# Patient Record
Sex: MALE | Race: BLACK OR AFRICAN AMERICAN | NOT HISPANIC OR LATINO
[De-identification: names, ages, dates, MRNs, and addresses within clinical notes are randomized per-mention and may not be internally consistent; named-entity substitution may affect disease eponyms.]

---

## 2020-03-25 ENCOUNTER — TRANSCRIPTION ENCOUNTER (OUTPATIENT)
Age: 40
End: 2020-03-25

## 2022-08-09 ENCOUNTER — EMERGENCY (EMERGENCY)
Facility: HOSPITAL | Age: 42
LOS: 1 days | Discharge: HOME | End: 2022-08-09
Attending: STUDENT IN AN ORGANIZED HEALTH CARE EDUCATION/TRAINING PROGRAM | Admitting: STUDENT IN AN ORGANIZED HEALTH CARE EDUCATION/TRAINING PROGRAM
Payer: COMMERCIAL

## 2022-08-09 VITALS
OXYGEN SATURATION: 97 % | HEART RATE: 61 BPM | RESPIRATION RATE: 20 BRPM | DIASTOLIC BLOOD PRESSURE: 88 MMHG | SYSTOLIC BLOOD PRESSURE: 133 MMHG | TEMPERATURE: 100 F

## 2022-08-09 VITALS
HEART RATE: 96 BPM | OXYGEN SATURATION: 97 % | DIASTOLIC BLOOD PRESSURE: 85 MMHG | WEIGHT: 259.93 LBS | SYSTOLIC BLOOD PRESSURE: 113 MMHG | HEIGHT: 76 IN | RESPIRATION RATE: 20 BRPM | TEMPERATURE: 100 F

## 2022-08-09 DIAGNOSIS — Z88.5 ALLERGY STATUS TO NARCOTIC AGENT: ICD-10-CM

## 2022-08-09 DIAGNOSIS — R06.02 SHORTNESS OF BREATH: ICD-10-CM

## 2022-08-09 DIAGNOSIS — Z20.822 CONTACT WITH AND (SUSPECTED) EXPOSURE TO COVID-19: ICD-10-CM

## 2022-08-09 DIAGNOSIS — I49.49 OTHER PREMATURE DEPOLARIZATION: ICD-10-CM

## 2022-08-09 DIAGNOSIS — R40.4 TRANSIENT ALTERATION OF AWARENESS: ICD-10-CM

## 2022-08-09 DIAGNOSIS — F17.200 NICOTINE DEPENDENCE, UNSPECIFIED, UNCOMPLICATED: ICD-10-CM

## 2022-08-09 DIAGNOSIS — R51.9 HEADACHE, UNSPECIFIED: ICD-10-CM

## 2022-08-09 DIAGNOSIS — R42 DIZZINESS AND GIDDINESS: ICD-10-CM

## 2022-08-09 DIAGNOSIS — J45.909 UNSPECIFIED ASTHMA, UNCOMPLICATED: ICD-10-CM

## 2022-08-09 DIAGNOSIS — Z87.19 PERSONAL HISTORY OF OTHER DISEASES OF THE DIGESTIVE SYSTEM: ICD-10-CM

## 2022-08-09 DIAGNOSIS — I10 ESSENTIAL (PRIMARY) HYPERTENSION: ICD-10-CM

## 2022-08-09 DIAGNOSIS — H53.8 OTHER VISUAL DISTURBANCES: ICD-10-CM

## 2022-08-09 LAB
ALBUMIN SERPL ELPH-MCNC: 4.2 G/DL — SIGNIFICANT CHANGE UP (ref 3.3–5)
ALBUMIN SERPL ELPH-MCNC: 4.6 G/DL — SIGNIFICANT CHANGE UP (ref 3.3–5)
ALP SERPL-CCNC: 75 U/L — SIGNIFICANT CHANGE UP (ref 40–120)
ALP SERPL-CCNC: 80 U/L — SIGNIFICANT CHANGE UP (ref 40–120)
ALT FLD-CCNC: 10 U/L — SIGNIFICANT CHANGE UP (ref 10–45)
ALT FLD-CCNC: 10 U/L — SIGNIFICANT CHANGE UP (ref 10–45)
ANION GAP SERPL CALC-SCNC: 12 MMOL/L — SIGNIFICANT CHANGE UP (ref 5–17)
ANION GAP SERPL CALC-SCNC: 15 MMOL/L — SIGNIFICANT CHANGE UP (ref 5–17)
APPEARANCE UR: CLEAR — SIGNIFICANT CHANGE UP
APTT BLD: 23 SEC — LOW (ref 27.5–35.5)
AST SERPL-CCNC: 19 U/L — SIGNIFICANT CHANGE UP (ref 10–40)
AST SERPL-CCNC: 20 U/L — SIGNIFICANT CHANGE UP (ref 10–40)
BACTERIA # UR AUTO: PRESENT /HPF
BILIRUB SERPL-MCNC: 0.6 MG/DL — SIGNIFICANT CHANGE UP (ref 0.2–1.2)
BILIRUB SERPL-MCNC: 0.7 MG/DL — SIGNIFICANT CHANGE UP (ref 0.2–1.2)
BILIRUB UR-MCNC: ABNORMAL
BUN SERPL-MCNC: 13 MG/DL — SIGNIFICANT CHANGE UP (ref 7–23)
BUN SERPL-MCNC: 16 MG/DL — SIGNIFICANT CHANGE UP (ref 7–23)
CALCIUM SERPL-MCNC: 10.5 MG/DL — SIGNIFICANT CHANGE UP (ref 8.4–10.5)
CALCIUM SERPL-MCNC: 9.8 MG/DL — SIGNIFICANT CHANGE UP (ref 8.4–10.5)
CHLORIDE SERPL-SCNC: 101 MMOL/L — SIGNIFICANT CHANGE UP (ref 96–108)
CHLORIDE SERPL-SCNC: 99 MMOL/L — SIGNIFICANT CHANGE UP (ref 96–108)
CO2 SERPL-SCNC: 24 MMOL/L — SIGNIFICANT CHANGE UP (ref 22–31)
CO2 SERPL-SCNC: 25 MMOL/L — SIGNIFICANT CHANGE UP (ref 22–31)
COLOR SPEC: YELLOW — SIGNIFICANT CHANGE UP
CREAT SERPL-MCNC: 1.1 MG/DL — SIGNIFICANT CHANGE UP (ref 0.5–1.3)
CREAT SERPL-MCNC: 1.77 MG/DL — HIGH (ref 0.5–1.3)
DIFF PNL FLD: NEGATIVE — SIGNIFICANT CHANGE UP
EGFR: 49 ML/MIN/1.73M2 — LOW
EGFR: 87 ML/MIN/1.73M2 — SIGNIFICANT CHANGE UP
EPI CELLS # UR: SIGNIFICANT CHANGE UP /HPF (ref 0–5)
GLUCOSE SERPL-MCNC: 105 MG/DL — HIGH (ref 70–99)
GLUCOSE SERPL-MCNC: 106 MG/DL — HIGH (ref 70–99)
GLUCOSE UR QL: NEGATIVE — SIGNIFICANT CHANGE UP
GRAN CASTS # UR COMP ASSIST: ABNORMAL /LPF
HCT VFR BLD CALC: 38.6 % — LOW (ref 39–50)
HGB BLD-MCNC: 13.9 G/DL — SIGNIFICANT CHANGE UP (ref 13–17)
HYALINE CASTS # UR AUTO: ABNORMAL /LPF (ref 0–2)
INR BLD: 1.04 — SIGNIFICANT CHANGE UP (ref 0.88–1.16)
KETONES UR-MCNC: 15 MG/DL
LACTATE SERPL-SCNC: 0.9 MMOL/L — SIGNIFICANT CHANGE UP (ref 0.5–2)
LACTATE SERPL-SCNC: 3.1 MMOL/L — HIGH (ref 0.5–2)
LEUKOCYTE ESTERASE UR-ACNC: ABNORMAL
MCHC RBC-ENTMCNC: 29.9 PG — SIGNIFICANT CHANGE UP (ref 27–34)
MCHC RBC-ENTMCNC: 36 GM/DL — SIGNIFICANT CHANGE UP (ref 32–36)
MCV RBC AUTO: 83 FL — SIGNIFICANT CHANGE UP (ref 80–100)
NITRITE UR-MCNC: NEGATIVE — SIGNIFICANT CHANGE UP
NRBC # BLD: 0 /100 WBCS — SIGNIFICANT CHANGE UP (ref 0–0)
PH UR: 7 — SIGNIFICANT CHANGE UP (ref 5–8)
PLATELET # BLD AUTO: 249 K/UL — SIGNIFICANT CHANGE UP (ref 150–400)
POTASSIUM SERPL-MCNC: 3.3 MMOL/L — LOW (ref 3.5–5.3)
POTASSIUM SERPL-MCNC: 4 MMOL/L — SIGNIFICANT CHANGE UP (ref 3.5–5.3)
POTASSIUM SERPL-SCNC: 3.3 MMOL/L — LOW (ref 3.5–5.3)
POTASSIUM SERPL-SCNC: 4 MMOL/L — SIGNIFICANT CHANGE UP (ref 3.5–5.3)
PROT SERPL-MCNC: 7.9 G/DL — SIGNIFICANT CHANGE UP (ref 6–8.3)
PROT SERPL-MCNC: 8.5 G/DL — HIGH (ref 6–8.3)
PROT UR-MCNC: 100 MG/DL
PROTHROM AB SERPL-ACNC: 12.4 SEC — SIGNIFICANT CHANGE UP (ref 10.5–13.4)
RBC # BLD: 4.65 M/UL — SIGNIFICANT CHANGE UP (ref 4.2–5.8)
RBC # FLD: 15 % — HIGH (ref 10.3–14.5)
RBC CASTS # UR COMP ASSIST: < 5 /HPF — SIGNIFICANT CHANGE UP
SARS-COV-2 RNA SPEC QL NAA+PROBE: NEGATIVE — SIGNIFICANT CHANGE UP
SODIUM SERPL-SCNC: 137 MMOL/L — SIGNIFICANT CHANGE UP (ref 135–145)
SODIUM SERPL-SCNC: 139 MMOL/L — SIGNIFICANT CHANGE UP (ref 135–145)
SP GR SPEC: 1.02 — SIGNIFICANT CHANGE UP (ref 1–1.03)
UROBILINOGEN FLD QL: 2 E.U./DL
WBC # BLD: 9.82 K/UL — SIGNIFICANT CHANGE UP (ref 3.8–10.5)
WBC # FLD AUTO: 9.82 K/UL — SIGNIFICANT CHANGE UP (ref 3.8–10.5)
WBC UR QL: ABNORMAL /HPF

## 2022-08-09 PROCEDURE — 85610 PROTHROMBIN TIME: CPT

## 2022-08-09 PROCEDURE — 83605 ASSAY OF LACTIC ACID: CPT

## 2022-08-09 PROCEDURE — 85730 THROMBOPLASTIN TIME PARTIAL: CPT

## 2022-08-09 PROCEDURE — G1004: CPT

## 2022-08-09 PROCEDURE — 82550 ASSAY OF CK (CPK): CPT

## 2022-08-09 PROCEDURE — 99285 EMERGENCY DEPT VISIT HI MDM: CPT | Mod: 25

## 2022-08-09 PROCEDURE — 70450 CT HEAD/BRAIN W/O DYE: CPT | Mod: ME

## 2022-08-09 PROCEDURE — 93005 ELECTROCARDIOGRAM TRACING: CPT

## 2022-08-09 PROCEDURE — 85027 COMPLETE CBC AUTOMATED: CPT

## 2022-08-09 PROCEDURE — 99285 EMERGENCY DEPT VISIT HI MDM: CPT

## 2022-08-09 PROCEDURE — 80053 COMPREHEN METABOLIC PANEL: CPT

## 2022-08-09 PROCEDURE — 87635 SARS-COV-2 COVID-19 AMP PRB: CPT

## 2022-08-09 PROCEDURE — 82962 GLUCOSE BLOOD TEST: CPT

## 2022-08-09 PROCEDURE — 93010 ELECTROCARDIOGRAM REPORT: CPT

## 2022-08-09 PROCEDURE — 36415 COLL VENOUS BLD VENIPUNCTURE: CPT

## 2022-08-09 PROCEDURE — 70450 CT HEAD/BRAIN W/O DYE: CPT | Mod: 26,ME

## 2022-08-09 PROCEDURE — 81001 URINALYSIS AUTO W/SCOPE: CPT

## 2022-08-09 RX ORDER — ACETAMINOPHEN 500 MG
650 TABLET ORAL ONCE
Refills: 0 | Status: COMPLETED | OUTPATIENT
Start: 2022-08-09 | End: 2022-08-09

## 2022-08-09 RX ORDER — POTASSIUM CHLORIDE 20 MEQ
40 PACKET (EA) ORAL ONCE
Refills: 0 | Status: COMPLETED | OUTPATIENT
Start: 2022-08-09 | End: 2022-08-09

## 2022-08-09 RX ORDER — SODIUM CHLORIDE 9 MG/ML
2000 INJECTION INTRAMUSCULAR; INTRAVENOUS; SUBCUTANEOUS ONCE
Refills: 0 | Status: COMPLETED | OUTPATIENT
Start: 2022-08-09 | End: 2022-08-09

## 2022-08-09 RX ADMIN — Medication 40 MILLIEQUIVALENT(S): at 18:39

## 2022-08-09 RX ADMIN — SODIUM CHLORIDE 2000 MILLILITER(S): 9 INJECTION INTRAMUSCULAR; INTRAVENOUS; SUBCUTANEOUS at 16:04

## 2022-08-09 RX ADMIN — Medication 650 MILLIGRAM(S): at 20:18

## 2022-08-09 RX ADMIN — Medication 650 MILLIGRAM(S): at 16:05

## 2022-08-09 NOTE — ED PROVIDER NOTE - CARE PROVIDER_API CALL
Shauna Herrera)  Neurology  [address], [address] [place] [address]  Phone: [phone]  Fax: [phone]  Follow Up Time: 4-6 Days

## 2022-08-09 NOTE — ED PROVIDER NOTE - NSFOLLOWUPINSTRUCTIONS_ED_ALL_ED_FT
Heat Stroke      Heat stroke is the most severe stage of heat illness. It usually happens when the body is not able to cool itself, such as during prolonged physical activity in hot weather.    There are two types of heat stroke:  •Exertional heat stroke (EHS). This happens when you are overly active in the heat, such as during intense exercise outdoors in hot and humid conditions. EHS is more common in young people who are healthy and active.      •Nonexertional heat stroke (NEHS). This happens when a person is exposed to heat for long periods of time, such as during a heat wave or not having access to air conditioning. Infants, older adults, and those with a long-term (chronic) disease are more likely to be affected by NEHS.      Heat stroke is a medical emergency. If you have heat stroke, your body needs to be cooled as soon as possible to prevent damage to your brain, heart, kidneys, and muscles. Heat stroke can be life-threatening.      What are the causes?    Heat stroke happens when the body cannot cool itself after being in the heat for a long time or being overly active in the heat.      What increases the risk?    The following factors may make you more likely to develop this condition:  •Being physically active in hot weather, such as working outdoors, doing  exercises outdoors, or playing sports.      •Being a very young child or an older adult.      •Being very overweight, in poor physical shape, or not being used to heat.      •Having a chronic condition that affects the heart and lungs, a neurological disorder, or a skin condition that interferes with sweating.      •Taking medicine that can interfere with the body's ability to cool down, such as beta blockers, diuretics, or antidepressants.      •Not drinking enough water or sports drinks with electrolytes in the heat.      •Drinking alcohol in the heat. This can interfere with heat regulation.      •Using certain drugs.      •Ignoring the warning signs of heat cramps or heat exhaustion, which can lead to heat stroke.        What are the signs or symptoms?    Signs of heat stroke include:  •Body temperature of 104°F (40°C) or higher.      •Extreme fatigue.      •Rapid heartbeat and breathing.      •Headaches.      •Severe nausea, vomiting, or both.      •Hot, red skin that is dry or moist.      •Changes in mental function. Signs may include dizziness, slurred speech, confusion, irritability, and delusions.    •Physical signs of brain dysfunction, such as:  •Clumsiness.      •Tremors.      •Uncontrolled muscle movements.      •Seizures.      •Loss of consciousness.          How is this diagnosed?    This condition may be diagnosed based on your symptoms and history of heat exposure. You may also have:  •A physical exam to check your body temperature and to look for signs of mental and physical brain dysfunction.      •Blood tests to measure electrolytes and to check for signs of damage to your kidneys or muscles.        How is this treated?    Heat stroke is a medical emergency. Call 911 if you or someone else has signs of heat stroke. Stay with the person until emergency services arrive and follow these instructions:  •Stop physical activity immediately.      •Move to a shaded, cooler area with air conditioning or to an area with good air ventilation.      •Remove any tight, damp, or heavy clothing. Try to expose as much skin to the air as possible.      •Cool off by spraying with cold water, using water-soaked towels, or placing ice packs on the head, neck, armpits, and groin.      •Drink fluids if not vomiting and mental function is normal.      Treatment at the hospital may include:  •Breathing support.      •Cardiac, respiratory, and temperature monitoring.      •Fluids given through an IV.      •Cooling using circulating fans, water mists, ice packs, or a combination of these.      •Immersion in cold water to lower body temperature quickly.      •Medicine to stop shivering. Shivering increases your body temperature.      •Blood tests, an EKG, and imaging tests if necessary.        Follow these instructions at home:    •Take over-the-counter and prescription medicines only as told by your health care provider.      •Drink enough fluid to keep your urine pale yellow. Make sure to drink fluids with electrolytes.      •Return to your normal activities as told by your health care provider. Ask your health care provider what activities are safe for you.      •Keep all follow-up visits. This is important.        How is this prevented?     • Do not exercise or work in high heat and humidity. If this is not possible, make sure to take frequent breaks in cooler areas.      •Wear light clothing and a hat.      •Drink plenty of fluids. Sports drinks will help replace minerals.      •Stay in an air-conditioned area, especially if you are not used to heat.      •if you do not have access to air conditioning, stay with others who do, especially during the hotter months.      •If you have heat cramps or other signs of heat exhaustion, get out of the heat right away. Move to a shaded or air-conditioned area and drink fluids to cool your body.        Contact a health care provider if:    •You have questions about your medicines.      •You have questions about which activities are safe for you.        Get help right away if:    •You or someone else has signs of heat stroke or heat illness and cannot cool down or properly hydrate.      •Your symptoms do not improve after taking steps to cool off.      These symptoms may represent a serious problem that is an emergency. Do not wait to see if the symptoms will go away. Get medical help right away. Call your local emergency services (911 in the [place] Do not drive yourself to the hospital.        Summary    •Heat stroke is the most severe type of heat illness that happens when your body is not able to cool itself. Heat stroke occurs when your body temperature reaches 104°F (40°C) or higher.      •Signs of heat stroke include rapid heartbeat and breathing, red skin that is hot and dry or moist, and physical or mental signs of brain dysfunction.      •Heat stroke is a medical emergency. Call 911 if you or anyone else has signs of heat stroke.      •Heat stroke should always be treated in a hospital. Treatment includes rapid body cooling with fanning, water-misting, cold water immersion, IV fluids, or a combination.      • Do not exercise or work in high heat and humidity if you have chronic medical conditions or are in poor physical shape. Wear light, breathable clothing, and drink plenty of fluids and electrolytes. During the hotter months, make sure you have access to air conditioning or cooler areas with ventilation.      This information is not intended to replace advice given to you by your health care provider. Make sure you discuss any questions you have with your health care provider.      Document Revised: 06/14/2021 Document Reviewed: 06/14/2021    Elsevier Patient Education © 2022 [employer]      Seizure, Adult      A seizure is a sudden burst of abnormal electrical and chemical activity in the brain. Seizures usually last from 30 seconds to 2 minutes. The abnormal activity temporarily interrupts normal brain function.    Many types of seizures can affect adults. A seizure can cause many different symptoms depending on where in the brain it starts.      What are the causes?    Common causes of this condition include:  •Fever or infection.      •Brain injury, head trauma, bleeding in the brain, or a brain tumor.      •Low levels of blood sugar or salt (sodium).      •Kidney problems or liver problems.      •Metabolic disorders or other conditions that are passed from parent to child (are inherited).      •Reaction to a substance, such as a drug or a medicine, or suddenly stopping the use of a substance (withdrawal).      •A stroke.      •Developmental disorders such as autism spectrum disorder or cerebral palsy.      In some cases, the cause of a seizure may not be known. Some people who have a seizure never have another one. A person who has repeated seizures over time without a clear cause has a condition called epilepsy.      What increases the risk?    You are more likely to develop this condition if:  •You have a family history of epilepsy.      •You have had a tonic–clonic seizure before. This type of seizure causes tightening (contraction) of the muscles of the whole body and loss of consciousness.      •You have a history of head trauma, lack of oxygen at birth, or strokes.        What are the signs or symptoms?    There are many different types of seizures. The symptoms vary depending on the type of seizure you have. Symptoms occur during the seizure. They may also occur before a seizure (aura) and after a seizure (postictal). Symptoms may include the following:    Symptoms during a seizure     •Uncontrollable shaking (convulsions) with fast, jerky movements of muscles.      •Stiffening of the body.      •Breathing problems.      •Confusion, staring, or unresponsiveness.      •Head nodding, eye blinking or fluttering, or rapid eye movements.      •Drooling, grunting, or making clicking sounds with your mouth.      •Loss of bladder control and bowel control.      Symptoms before a seizure     •Fear or anxiety.      •Nausea.    •Vertigo. This is a feeling like:  •You are moving when you are not.      •Your surroundings are moving when they are not.        •Déjà vu. This is a feeling of having seen or heard something before.      •Odd tastes or smells.      •Changes in vision, such as seeing flashing lights or spots.      Symptoms after a seizure     •Confusion.      •Sleepiness.      •Headache.      •Sore muscles.        How is this diagnosed?    This condition may be diagnosed based on:  •A description of your symptoms. Video of your seizures can be helpful.      •Your medical history.      •A physical exam.      You may also have tests, including:  •Blood tests.      •CT scan.      •MRI.      •Electroencephalogram (EEG). This test measures electrical activity in the brain. An EEG can predict whether seizures will return.      •A spinal tap, also called a lumbar puncture. This is the removal and testing of fluid that surrounds the brain and spinal cord.        How is this treated?    Most seizures will stop on their own in less than 5 minutes, and no treatment is needed. Seizures that last longer than 5 minutes will usually need treatment.    Seizures may be treated with:  •Medicines given through an IV.      •Avoiding known triggers, such as medicines that you take for another condition.      •Medicines to control seizures or prevent future seizures (antiepileptics), if epilepsy caused your seizures.      •Medical devices to prevent and control seizures.      •Surgery to stop seizures or to reduce how often seizures happen, if you have epilepsy that does not respond to medicines.      •A diet low in carbohydrates and high in fat (ketogenic diet).        Follow these instructions at home:    Medicines     •Take over-the-counter and prescription medicines only as told by your health care provider.      •Avoid any substances that may prevent your medicine from working properly, such as alcohol.      Activity     •Follow instructions about activities, such as driving or swimming, that would be dangerous if you had another seizure. Wait until your health care provider says it is safe to do them.      •If you live in the [place] check with your local department of motor vehicles (DMV) to find out about local driving laws. Each state has specific rules about when you can legally drive again.      •Get enough rest. Lack of sleep can make seizures more likely to occur.        Educating others    •Teach friends and family what to do if you have a seizure. They should:  •Help you get down to the ground, to prevent a fall.      •Cushion your head and move items away from your body.      •Loosen any tight clothing around your neck.      •Turn you on your side. If you vomit, this helps keep your airway clear.      •Know whether or not you need emergency care.      •Stay with you until you recover.      •Also, tell them what not to do if you have a seizure. Tell them:  •They should not hold you down. Holding you down will not stop the seizure.      •They should not put anything in your mouth.        General instructions     •Avoid anything that has ever triggered a seizure for you.      •Keep a seizure diary. Record what you remember about each seizure, especially anything that might have triggered it.      •Keep all follow-up visits. This is important.        Contact a health care provider if:    •You have another seizure or seizures. Call each time you have a seizure.      •Your seizure pattern changes.      •You continue to have seizures with treatment.      •You have symptoms of an infection or illness. Either of these might increase your risk of having a seizure.      •You are unable to take your medicine.        Get help right away if:  •You have:  •A seizure that does not stop after 5 minutes.      •Several seizures in a row without a complete recovery between seizures.      •A seizure that makes it harder to breathe.      •A seizure that leaves you unable to speak or use a part of your body.        •You do not wake up right away after a seizure.      •You injure yourself during a seizure.      •You have confusion or pain right after a seizure.      These symptoms may represent a serious problem that is an emergency. Do not wait to see if the symptoms will go away. Get medical help right away. Call your local emergency services (911 in the [place] Do not drive yourself to the hospital.       Summary    •Seizures are caused by abnormal electrical and chemical activity in the brain. The activity disrupts normal brain function and can cause various symptoms.      •Seizures have many causes, including illness, head injuries, low levels of blood sugar or salt, and certain conditions.      •Most seizures will stop on their own in less than 5 minutes. Seizures that last longer than 5 minutes are a medical emergency and need treatment right away.      •Many medicines are used to treat seizures. Take over-the-counter and prescription medicines only as told by your health care provider.      This information is not intended to replace advice given to you by your health care provider. Make sure you discuss any questions you have with your health care provider.

## 2022-08-09 NOTE — ED ADULT NURSE REASSESSMENT NOTE - NS ED NURSE REASSESS COMMENT FT1
Pt. is [employer] office / facility. Pt. brought to RN attention that he has his firearms on person. RN Parra escalated information to charge RN and Leann Sahu. Security paged immediately, Leroy removed firearms from pt. and has it locked away securely for duration of pt's visit. Pt. aware, amendable  to protocol and removal of firearms, understands indications for safety. will continue to assess.

## 2022-08-09 NOTE — ED PROVIDER NOTE - CLINICAL SUMMARY MEDICAL DECISION MAKING FREE TEXT BOX
syncope vs seizure, consideration for heat stroke/ exhaustion however rectal temp 100.4 upon arrival, r/o metabolic derangement, hydration, re-assess syncope vs seizure vs heat stroke, rectal temp 100.4 upon arrival, r/o metabolic derangement, hydration, r/o rhabdo, given new headache r/o ich, re-assess

## 2022-08-09 NOTE — ED ADULT TRIAGE NOTE - CHIEF COMPLAINT QUOTE
43 y/o male BIBEMS for evaluation of seizure like activity while in [facility]. once patient becoming alert pt reports having asthma attack and using his inhaler.  Pt denies hx of seizures, reports hx of HTN and asthma. Pt reports he is now back to normal.

## 2022-08-09 NOTE — ED PROVIDER NOTE - PROGRESS NOTE DETAILS
pt remains asymptomatic while monitored in ed will dc with neurology and pcp follow up. pt aware to not drive/operate heavy machinery until cleared by pcp/neuro pt absolutely will not stay for eeg, understands need it to help differentiate between seizure and heat stroke, pt understands eeg most useful in 1st 24/hrs after event. pt and family aware of risk/benefits pt states he will follow up as an outpatient. agrees to stay for repeat labs.

## 2022-08-09 NOTE — ED PROVIDER NOTE - NS ED ROS FT
no fever  no eye discharge  no cp  no shortness of breath  no vomiting  no rash  no new weakness arm/leg  no oppositional behavior  no nuchal rigidity  no laceration  + LOC

## 2022-08-09 NOTE — ED PROVIDER NOTE - PATIENT PORTAL LINK FT
You can access the FollowMyHealth Patient Portal offered by [facility] by registering at the following website: [email or web address]. By joining [facility]’s FollowMyHealth portal, you will also be able to view your health information using other applications (apps) compatible with our system.

## 2022-08-09 NOTE — ED ADULT NURSE REASSESSMENT NOTE - NS ED NURSE REASSESS COMMENT FT1
Pt received from day shift RN. Pt A&Ox4. Pt is awake and conversive. Denies any discomfort. Will cont to re assess.

## 2022-08-09 NOTE — ED PROVIDER NOTE - OBJECTIVE STATEMENT
42m with pmh of pancreatitis, htn, mild asthma p/w 1 hr after having dizzines, blurry vision/sob and possible 2 minutes of "seizure like activity". no urination[place]de[place]. never had similar episode in past.  + left temporal headache started after the fall, ?"brain anuersym      no headtrauma/loc. 42m with pmh of pancreatitis, htn, mild asthma p/w 1 hr after having dizziness, blurry vision/sob and possible 2 minutes of "seizure like activity". no urination[place]de[place]. never had similar episode in past.  + left temporal headache started after the fall, ?"brain anureses      no headtrauma/loc.

## 2022-08-09 NOTE — ED PROVIDER NOTE - PHYSICAL EXAMINATION
NAD  EOMI  airway patent  +S1S2 no mrg  CTA b/l  soft nt nd  no le edema;  normal mood and affect, pleasant  5/5 UE and LE strength, steady gait, normal finger to nose.

## 2022-08-09 NOTE — ED ADULT NURSE NOTE - CHIEF COMPLAINT QUOTE
41 y/o male BIBEMS for evaluation of seizure like activity while in [facility]. once patient becoming alert pt reports having asthma attack and using his inhaler.  Pt denies hx of seizures, reports hx of HTN and asthma. Pt reports he is now back to normal.

## 2022-08-09 NOTE — ED PROVIDER NOTE - CARE PROVIDERS DIRECT ADDRESSES
[email or web address]

## 2023-01-03 ENCOUNTER — APPOINTMENT (OUTPATIENT)
Dept: NEUROLOGY | Facility: CLINIC | Age: 43
End: 2023-01-03
